# Patient Record
Sex: MALE | Race: NATIVE HAWAIIAN OR OTHER PACIFIC ISLANDER
[De-identification: names, ages, dates, MRNs, and addresses within clinical notes are randomized per-mention and may not be internally consistent; named-entity substitution may affect disease eponyms.]

---

## 2018-02-20 ENCOUNTER — HOSPITAL ENCOUNTER (EMERGENCY)
Dept: HOSPITAL 14 - H.ER | Age: 83
Discharge: HOME | End: 2018-02-20
Payer: MEDICARE

## 2018-02-20 VITALS — TEMPERATURE: 98.5 F | SYSTOLIC BLOOD PRESSURE: 115 MMHG | DIASTOLIC BLOOD PRESSURE: 72 MMHG | HEART RATE: 76 BPM

## 2018-02-20 VITALS — RESPIRATION RATE: 18 BRPM

## 2018-02-20 VITALS — OXYGEN SATURATION: 97 %

## 2018-02-20 DIAGNOSIS — J40: Primary | ICD-10-CM

## 2018-02-20 LAB
ALBUMIN SERPL-MCNC: 3.9 G/DL (ref 3.5–5)
ALBUMIN/GLOB SERPL: 1 {RATIO} (ref 1–2.1)
ALT SERPL-CCNC: 34 U/L (ref 21–72)
AST SERPL-CCNC: 28 U/L (ref 17–59)
BASOPHILS # BLD AUTO: 0 K/UL (ref 0–0.2)
BASOPHILS NFR BLD: 0.2 % (ref 0–2)
BUN SERPL-MCNC: 16 MG/DL (ref 9–20)
CALCIUM SERPL-MCNC: 8.5 MG/DL (ref 8.4–10.2)
EOSINOPHIL # BLD AUTO: 0.1 K/UL (ref 0–0.7)
EOSINOPHIL NFR BLD: 1 % (ref 0–4)
ERYTHROCYTE [DISTWIDTH] IN BLOOD BY AUTOMATED COUNT: 13.2 % (ref 11.5–14.5)
GFR NON-AFRICAN AMERICAN: > 60
HGB BLD-MCNC: 13.1 G/DL (ref 12–18)
LYMPHOCYTES # BLD AUTO: 1.6 K/UL (ref 1–4.3)
LYMPHOCYTES NFR BLD AUTO: 21.7 % (ref 20–40)
MCH RBC QN AUTO: 30.7 PG (ref 27–31)
MCHC RBC AUTO-ENTMCNC: 33.3 G/DL (ref 33–37)
MCV RBC AUTO: 92 FL (ref 80–94)
MONOCYTES # BLD: 0.8 K/UL (ref 0–0.8)
MONOCYTES NFR BLD: 10.4 % (ref 0–10)
NEUTROPHILS # BLD: 5 K/UL (ref 1.8–7)
NEUTROPHILS NFR BLD AUTO: 66.7 % (ref 50–75)
NRBC BLD AUTO-RTO: 0.1 % (ref 0–0)
PLATELET # BLD: 152 K/UL (ref 130–400)
PMV BLD AUTO: 7.9 FL (ref 7.2–11.7)
RBC # BLD AUTO: 4.27 MIL/UL (ref 4.4–5.9)
WBC # BLD AUTO: 7.4 K/UL (ref 4.8–10.8)

## 2018-02-20 NOTE — RAD
HISTORY:

cough  



COMPARISON:

Chest radiograph dated 09/05/2014.



TECHNIQUE:

Chest PA and lateral



FINDINGS:



LUNGS:

Scattered left lung calcifications redemonstrated. No active 

pulmonary disease.



PLEURA:

No significant pleural effusion identified. No pneumothorax apparent.



CARDIOVASCULAR:

Atherosclerotic aortic calcifications.  Cardiomediastinal silhouette 

within normal limits.



OSSEOUS STRUCTURES:

Unchanged.



VISUALIZED UPPER ABDOMEN:

Normal.



OTHER FINDINGS:

None.



IMPRESSION:

No active disease.

## 2018-02-20 NOTE — ED PDOC
HPI: CCC, URI, Sore Throat


Time Seen by Provider: 02/20/18 09:35


Chief Complaint (Nursing): Flu-like Symptoms


Chief Complaint (Provider): cough


History Per: Patient,  ([email or web address] #[id number])


History/Exam Limitations: language barrier (mandarin)


Onset/Duration Of Symptoms: Days (x2 weeks)


Current Symptoms Are (Timing): Still Present


Additional Complaint(s): 





83 year old male who presents to the emergency department with a complaint of 

cough associated with green sputum, fever (tmax: 101.3) and generalized 

weakness ongoing for 2 weeks. Denied any vomiting, diarrhea or chest pain. 

Patient was seen by PMD 2 weeks ago for initial onset but was not given any 

medications. Wife also reported giving patient Tylenol last night for relief.





PMD: none provided





Past Medical History


Reviewed: Historical Data, Nursing Documentation, Vital Signs


Vital Signs: 


 Last Vital Signs











Temp  98.5 F   02/20/18 13:03


 


Pulse  76   02/20/18 13:03


 


Resp  18   02/20/18 13:03


 


BP  115/72   02/20/18 13:03


 


Pulse Ox  97   02/20/18 13:19














- Medical History


PMH: No Chronic Diseases





- Surgical History


Surgical History: 


   Denies: No Surg Hx


Other surgeries: prostate 2005





- Family History


Family History: States: Unknown Family Hx





- Social History


Current smoker - smoking cessation education provided: No


Ex-Smoker (has not smoked in the last 12 months): No


Alcohol: None


Drugs: Denies





- Home Medications


Home Medications: 


 Ambulatory Orders











 Medication  Instructions  Recorded


 


Albuterol HFA [Ventolin HFA 90 1 - 2 puff IH Q4H PRN #1 bottle 02/20/18





mcg/actuation (8 g)]  


 


Azithromycin [Zithromax] 250 mg PO DAILY #6 tab 02/20/18














- Allergies


Allergies/Adverse Reactions: 


 Allergies











Allergy/AdvReac Type Severity Reaction Status Date / Time


 


No Known Allergies Allergy   Verified 02/20/18 10:18














Review of Systems


ROS Statement: Except As Marked, All Systems Reviewed And Found Negative


Constitutional: Positive for: Fever, Weakness


Cardiovascular: Negative for: Chest Pain


Respiratory: Positive for: Cough, Sputum (green)


Gastrointestinal: Negative for: Vomiting, Diarrhea





Physical Exam





- Reviewed


Nursing Documentation Reviewed: Yes


Vital Signs Reviewed: Yes





- Physical Exam


Appears: Positive for: Non-toxic, No Acute Distress


ENT: Positive for: Normal ENT Inspection, Pharynx Is (within normal limits).  

Negative for: Nasal Congestion, Pharyngeal Erythema


Neck: Positive for: Normal, Supple


Cardiovascular/Chest: Positive for: Regular Rate, Rhythm, Chest Non Tender


Respiratory: Positive for: Normal Breath Sounds, Wheezing (faint on right side)

.  Negative for: Decreased Breath Sounds, Respiratory Distress


Gastrointestinal/Abdominal: Positive for: Normal Exam, Soft.  Negative for: 

Tenderness


Extremity: Positive for: Normal ROM


Neurologic/Psych: Positive for: Alert, Oriented





- Laboratory Results


Result Diagrams: 


 02/20/18 10:40





 02/20/18 10:40





- ECG


O2 Sat by Pulse Oximetry: 97 (RA)


Pulse Ox Interpretation: Normal





Medical Decision Making


Medical Decision Making: 





Initial Impression: Cough rule out pneumonia





Initial Plan:


* CMP


* CBC


* CXR


* Albuterol 2.5mg INH


* Blood culture


* Rapid strep


________________________________________________________________________________

_____





Time: 1037


--CXR


FINDINGS:


LUNGS:


Scattered left lung calcifications redemonstrated. No active pulmonary disease.


PLEURA:


No significant pleural effusion identified. No pneumothorax apparent.


CARDIOVASCULAR:


Atherosclerotic aortic calcifications.  Cardiomediastinal silhouette within 

normal limits.


OSSEOUS STRUCTURES:


Unchanged.


VISUALIZED UPPER ABDOMEN:


Normal.


OTHER FINDINGS:


None.





IMPRESSION:


No active disease.





Time: 1040


--Rapid flu: negative


--Rapid strep: negative


________________________________________________________________________________

____





Time: 1257


--Upon provider reevaluation, patient is feeling better, is medically stable, 

and requires no further treatment in the ED at this time. Olu #[id number]  (

[place]) used to explain to patient (mandarin). Patient will be discharged home 

with Rx for bronchitis . Counseling was provided and all questions were 

answered regarding diagnosis and need for follow up with clinic in 1-2 days. 

There is agreement to discharge plan. Return if symptoms persist or worsen.





Clinical Impression: Bronchitis





--------------------------------------------------------------------------------

-----------------


Scribe Attestation:


Documented by Lorie Simmons, acting as a scribe for Suzanne Stephens MD.





Provider Scribe Attestation:


All medical record entries made by the Scribe were at my direction and 

personally dictated by me. I have reviewed the chart and agree that the record 

accurately reflects my personal performance of the history, physical exam, 

medical decision making, and the department course for this patient. I have 

also personally directed, reviewed, and agree with the discharge instructions 

and disposition.





Disposition





- Clinical Impression


Clinical Impression: 


 Bronchitis








- Patient ED Disposition


Is Patient to be Admitted: No


Counseled Patient/Family Regarding: Studies Performed, Diagnosis, Need For 

Followup





- Disposition


Referrals: 


[facility] [Outside]


[facility] [Outside]


Disposition: Routine/Home


Disposition Time: 11:30


Condition: IMPROVED


Additional Instructions: 


follow up with your primary doctor in 1-2 days


return to the ED with any worsening or concerning symptoms


Prescriptions: 


Albuterol HFA [Ventolin HFA 90 mcg/actuation (8 g)] 1 - 2 puff IH Q4H PRN #1 

bottle


 PRN Reason: Wheezing


Azithromycin [Zithromax] 250 mg PO DAILY #6 tab


Instructions:  Acute Bronchitis


Forms:  [employer] (English)